# Patient Record
Sex: FEMALE | Race: WHITE | Employment: FULL TIME | URBAN - METROPOLITAN AREA
[De-identification: names, ages, dates, MRNs, and addresses within clinical notes are randomized per-mention and may not be internally consistent; named-entity substitution may affect disease eponyms.]

---

## 2020-07-02 PROBLEM — Z34.00 SUPERVISION OF NORMAL FIRST PREGNANCY: Status: ACTIVE | Noted: 2020-07-02

## 2020-09-11 PROBLEM — O36.62X0 EXCESSIVE FETAL GROWTH AFFECTING MANAGEMENT OF PREGNANCY IN SECOND TRIMESTER: Status: ACTIVE | Noted: 2020-09-11

## 2021-01-28 ENCOUNTER — ANESTHESIA EVENT (OUTPATIENT)
Dept: LABOR AND DELIVERY | Age: 31
End: 2021-01-28
Payer: COMMERCIAL

## 2021-01-28 ENCOUNTER — ANESTHESIA (OUTPATIENT)
Dept: LABOR AND DELIVERY | Age: 31
End: 2021-01-28
Payer: COMMERCIAL

## 2021-01-28 ENCOUNTER — HOSPITAL ENCOUNTER (INPATIENT)
Age: 31
LOS: 2 days | Discharge: HOME | End: 2021-01-30
Attending: OBSTETRICS & GYNECOLOGY | Admitting: OBSTETRICS & GYNECOLOGY
Payer: COMMERCIAL

## 2021-01-28 PROBLEM — Z33.1 IUP (INTRAUTERINE PREGNANCY), INCIDENTAL: Status: ACTIVE | Noted: 2021-01-28

## 2021-01-28 LAB
ABO + RH BLD: NORMAL
BASOPHILS # BLD: 0 K/UL (ref 0–0.1)
BASOPHILS NFR BLD: 0 % (ref 0–2)
BLOOD GROUP ANTIBODIES SERPL: NORMAL
DIFFERENTIAL METHOD BLD: ABNORMAL
EOSINOPHIL # BLD: 0 K/UL (ref 0–0.4)
EOSINOPHIL NFR BLD: 0 % (ref 0–5)
ERYTHROCYTE [DISTWIDTH] IN BLOOD BY AUTOMATED COUNT: 12.6 % (ref 11.6–14.5)
HCT VFR BLD AUTO: 37.7 % (ref 35–45)
HGB BLD-MCNC: 13 G/DL (ref 12–16)
LYMPHOCYTES # BLD: 1.1 K/UL (ref 0.9–3.6)
LYMPHOCYTES NFR BLD: 15 % (ref 21–52)
MCH RBC QN AUTO: 30.8 PG (ref 24–34)
MCHC RBC AUTO-ENTMCNC: 34.5 G/DL (ref 31–37)
MCV RBC AUTO: 89.3 FL (ref 74–97)
MONOCYTES # BLD: 0.5 K/UL (ref 0.05–1.2)
MONOCYTES NFR BLD: 7 % (ref 3–10)
NEUTS SEG # BLD: 6 K/UL (ref 1.8–8)
NEUTS SEG NFR BLD: 78 % (ref 40–73)
PLATELET # BLD AUTO: 173 K/UL (ref 135–420)
PMV BLD AUTO: 11.4 FL (ref 9.2–11.8)
RBC # BLD AUTO: 4.22 M/UL (ref 4.2–5.3)
SPECIMEN EXP DATE BLD: NORMAL
WBC # BLD AUTO: 7.7 K/UL (ref 4.6–13.2)

## 2021-01-28 PROCEDURE — 74011250637 HC RX REV CODE- 250/637: Performed by: OBSTETRICS & GYNECOLOGY

## 2021-01-28 PROCEDURE — 74011000250 HC RX REV CODE- 250: Performed by: ANESTHESIOLOGY

## 2021-01-28 PROCEDURE — 75410000003 HC RECOV DEL/VAG/CSECN EA 0.5 HR

## 2021-01-28 PROCEDURE — 75410000002 HC LABOR FEE PER 1 HR

## 2021-01-28 PROCEDURE — 00HU33Z INSERTION OF INFUSION DEVICE INTO SPINAL CANAL, PERCUTANEOUS APPROACH: ICD-10-PCS | Performed by: ANESTHESIOLOGY

## 2021-01-28 PROCEDURE — 86901 BLOOD TYPING SEROLOGIC RH(D): CPT

## 2021-01-28 PROCEDURE — 74011250636 HC RX REV CODE- 250/636: Performed by: OBSTETRICS & GYNECOLOGY

## 2021-01-28 PROCEDURE — 74011250636 HC RX REV CODE- 250/636: Performed by: ANESTHESIOLOGY

## 2021-01-28 PROCEDURE — 75410000000 HC DELIVERY VAGINAL/SINGLE

## 2021-01-28 PROCEDURE — 76060000078 HC EPIDURAL ANESTHESIA

## 2021-01-28 PROCEDURE — 0KQM0ZZ REPAIR PERINEUM MUSCLE, OPEN APPROACH: ICD-10-PCS | Performed by: OBSTETRICS & GYNECOLOGY

## 2021-01-28 PROCEDURE — 65270000029 HC RM PRIVATE

## 2021-01-28 PROCEDURE — 85025 COMPLETE CBC W/AUTO DIFF WBC: CPT

## 2021-01-28 PROCEDURE — 10907ZC DRAINAGE OF AMNIOTIC FLUID, THERAPEUTIC FROM PRODUCTS OF CONCEPTION, VIA NATURAL OR ARTIFICIAL OPENING: ICD-10-PCS | Performed by: OBSTETRICS & GYNECOLOGY

## 2021-01-28 PROCEDURE — 77030007879 HC KT SPN EPDRL TELE -B: Performed by: ANESTHESIOLOGY

## 2021-01-28 RX ORDER — LIDOCAINE HYDROCHLORIDE 10 MG/ML
INJECTION INFILTRATION; PERINEURAL AS NEEDED
Status: DISCONTINUED | OUTPATIENT
Start: 2021-01-28 | End: 2021-01-28 | Stop reason: HOSPADM

## 2021-01-28 RX ORDER — LIDOCAINE HYDROCHLORIDE AND EPINEPHRINE 15; 5 MG/ML; UG/ML
INJECTION, SOLUTION EPIDURAL AS NEEDED
Status: DISCONTINUED | OUTPATIENT
Start: 2021-01-28 | End: 2021-01-28 | Stop reason: HOSPADM

## 2021-01-28 RX ORDER — ZOLPIDEM TARTRATE 5 MG/1
5 TABLET ORAL
Status: DISCONTINUED | OUTPATIENT
Start: 2021-01-28 | End: 2021-01-30 | Stop reason: HOSPADM

## 2021-01-28 RX ORDER — ACETAMINOPHEN 325 MG/1
650 TABLET ORAL
Status: DISCONTINUED | OUTPATIENT
Start: 2021-01-28 | End: 2021-01-30 | Stop reason: HOSPADM

## 2021-01-28 RX ORDER — PROMETHAZINE HYDROCHLORIDE 25 MG/ML
25 INJECTION, SOLUTION INTRAMUSCULAR; INTRAVENOUS
Status: DISCONTINUED | OUTPATIENT
Start: 2021-01-28 | End: 2021-01-30 | Stop reason: HOSPADM

## 2021-01-28 RX ORDER — LIDOCAINE HYDROCHLORIDE 10 MG/ML
20 INJECTION, SOLUTION EPIDURAL; INFILTRATION; INTRACAUDAL; PERINEURAL AS NEEDED
Status: DISCONTINUED | OUTPATIENT
Start: 2021-01-28 | End: 2021-01-28 | Stop reason: HOSPADM

## 2021-01-28 RX ORDER — MINERAL OIL
30 OIL (ML) ORAL AS NEEDED
Status: COMPLETED | OUTPATIENT
Start: 2021-01-28 | End: 2021-01-28

## 2021-01-28 RX ORDER — NALOXONE HYDROCHLORIDE 0.4 MG/ML
0.2 INJECTION, SOLUTION INTRAMUSCULAR; INTRAVENOUS; SUBCUTANEOUS AS NEEDED
Status: DISCONTINUED | OUTPATIENT
Start: 2021-01-28 | End: 2021-01-28 | Stop reason: HOSPADM

## 2021-01-28 RX ORDER — IBUPROFEN 400 MG/1
800 TABLET ORAL
Status: DISCONTINUED | OUTPATIENT
Start: 2021-01-28 | End: 2021-01-30 | Stop reason: HOSPADM

## 2021-01-28 RX ORDER — FENTANYL/ROPIVACAINE/NS/PF 2MCG/ML-.1
1-15 PLASTIC BAG, INJECTION (ML) EPIDURAL CONTINUOUS
Status: DISCONTINUED | OUTPATIENT
Start: 2021-01-28 | End: 2021-01-28 | Stop reason: HOSPADM

## 2021-01-28 RX ORDER — SODIUM CHLORIDE, SODIUM LACTATE, POTASSIUM CHLORIDE, CALCIUM CHLORIDE 600; 310; 30; 20 MG/100ML; MG/100ML; MG/100ML; MG/100ML
125 INJECTION, SOLUTION INTRAVENOUS CONTINUOUS
Status: DISCONTINUED | OUTPATIENT
Start: 2021-01-28 | End: 2021-01-28 | Stop reason: HOSPADM

## 2021-01-28 RX ORDER — OXYTOCIN/0.9 % SODIUM CHLORIDE 30/500 ML
87.3 PLASTIC BAG, INJECTION (ML) INTRAVENOUS AS NEEDED
Status: COMPLETED | OUTPATIENT
Start: 2021-01-28 | End: 2021-01-28

## 2021-01-28 RX ORDER — OXYTOCIN/0.9 % SODIUM CHLORIDE 30/500 ML
0-20 PLASTIC BAG, INJECTION (ML) INTRAVENOUS
Status: DISCONTINUED | OUTPATIENT
Start: 2021-01-28 | End: 2021-01-28

## 2021-01-28 RX ORDER — PHENYLEPHRINE HCL IN 0.9% NACL 1 MG/10 ML
80 SYRINGE (ML) INTRAVENOUS AS NEEDED
Status: DISCONTINUED | OUTPATIENT
Start: 2021-01-28 | End: 2021-01-28 | Stop reason: HOSPADM

## 2021-01-28 RX ORDER — SODIUM CHLORIDE 0.9 % (FLUSH) 0.9 %
5-40 SYRINGE (ML) INJECTION AS NEEDED
Status: DISCONTINUED | OUTPATIENT
Start: 2021-01-28 | End: 2021-01-28 | Stop reason: HOSPADM

## 2021-01-28 RX ORDER — NALBUPHINE HYDROCHLORIDE 10 MG/ML
10 INJECTION, SOLUTION INTRAMUSCULAR; INTRAVENOUS; SUBCUTANEOUS
Status: DISCONTINUED | OUTPATIENT
Start: 2021-01-28 | End: 2021-01-28 | Stop reason: HOSPADM

## 2021-01-28 RX ORDER — AMOXICILLIN 250 MG
1 CAPSULE ORAL
Status: DISCONTINUED | OUTPATIENT
Start: 2021-01-28 | End: 2021-01-30 | Stop reason: HOSPADM

## 2021-01-28 RX ORDER — OXYCODONE AND ACETAMINOPHEN 5; 325 MG/1; MG/1
2 TABLET ORAL
Status: DISCONTINUED | OUTPATIENT
Start: 2021-01-28 | End: 2021-01-30 | Stop reason: HOSPADM

## 2021-01-28 RX ORDER — BUTORPHANOL TARTRATE 2 MG/ML
2 INJECTION INTRAMUSCULAR; INTRAVENOUS
Status: DISCONTINUED | OUTPATIENT
Start: 2021-01-28 | End: 2021-01-28 | Stop reason: HOSPADM

## 2021-01-28 RX ORDER — OXYTOCIN/RINGER'S LACTATE 30/500 ML
10 PLASTIC BAG, INJECTION (ML) INTRAVENOUS AS NEEDED
Status: COMPLETED | OUTPATIENT
Start: 2021-01-28 | End: 2021-01-28

## 2021-01-28 RX ORDER — HYDROMORPHONE HYDROCHLORIDE 1 MG/ML
1 INJECTION, SOLUTION INTRAMUSCULAR; INTRAVENOUS; SUBCUTANEOUS
Status: DISCONTINUED | OUTPATIENT
Start: 2021-01-28 | End: 2021-01-28 | Stop reason: HOSPADM

## 2021-01-28 RX ORDER — ONDANSETRON 2 MG/ML
4 INJECTION INTRAMUSCULAR; INTRAVENOUS
Status: DISCONTINUED | OUTPATIENT
Start: 2021-01-28 | End: 2021-01-28 | Stop reason: HOSPADM

## 2021-01-28 RX ORDER — SODIUM CHLORIDE 0.9 % (FLUSH) 0.9 %
5-40 SYRINGE (ML) INJECTION EVERY 8 HOURS
Status: DISCONTINUED | OUTPATIENT
Start: 2021-01-28 | End: 2021-01-28 | Stop reason: HOSPADM

## 2021-01-28 RX ORDER — FENTANYL CITRATE 50 UG/ML
100 INJECTION, SOLUTION INTRAMUSCULAR; INTRAVENOUS ONCE
Status: COMPLETED | OUTPATIENT
Start: 2021-01-28 | End: 2021-01-28

## 2021-01-28 RX ORDER — TERBUTALINE SULFATE 1 MG/ML
0.25 INJECTION SUBCUTANEOUS
Status: DISCONTINUED | OUTPATIENT
Start: 2021-01-28 | End: 2021-01-28 | Stop reason: HOSPADM

## 2021-01-28 RX ORDER — PROMETHAZINE HYDROCHLORIDE 25 MG/ML
25 INJECTION, SOLUTION INTRAMUSCULAR; INTRAVENOUS
Status: DISCONTINUED | OUTPATIENT
Start: 2021-01-28 | End: 2021-01-28 | Stop reason: SDUPTHER

## 2021-01-28 RX ORDER — METHYLERGONOVINE MALEATE 0.2 MG/ML
0.2 INJECTION INTRAVENOUS AS NEEDED
Status: DISCONTINUED | OUTPATIENT
Start: 2021-01-28 | End: 2021-01-28 | Stop reason: HOSPADM

## 2021-01-28 RX ORDER — FENTANYL CITRATE 50 UG/ML
INJECTION, SOLUTION INTRAMUSCULAR; INTRAVENOUS AS NEEDED
Status: DISCONTINUED | OUTPATIENT
Start: 2021-01-28 | End: 2021-01-28 | Stop reason: HOSPADM

## 2021-01-28 RX ORDER — LIDOCAINE HYDROCHLORIDE AND EPINEPHRINE 15; 5 MG/ML; UG/ML
INJECTION, SOLUTION EPIDURAL
Status: SHIPPED | OUTPATIENT
Start: 2021-01-28 | End: 2021-01-28

## 2021-01-28 RX ADMIN — FENTANYL CITRATE 100 MCG: 50 INJECTION, SOLUTION INTRAMUSCULAR; INTRAVENOUS at 07:57

## 2021-01-28 RX ADMIN — LIDOCAINE HYDROCHLORIDE 3 ML: 10 INJECTION, SOLUTION INFILTRATION; PERINEURAL at 07:58

## 2021-01-28 RX ADMIN — LIDOCAINE HYDROCHLORIDE,EPINEPHRINE BITARTRATE 2 ML: 15; .005 INJECTION, SOLUTION EPIDURAL; INFILTRATION; INTRACAUDAL; PERINEURAL at 08:05

## 2021-01-28 RX ADMIN — MINERAL OIL 30 ML: 1000 SOLUTION ORAL at 14:43

## 2021-01-28 RX ADMIN — SODIUM CHLORIDE, SODIUM LACTATE, POTASSIUM CHLORIDE, AND CALCIUM CHLORIDE 125 ML/HR: 600; 310; 30; 20 INJECTION, SOLUTION INTRAVENOUS at 15:15

## 2021-01-28 RX ADMIN — Medication 87.3 MILLI-UNITS/MIN: at 16:51

## 2021-01-28 RX ADMIN — SODIUM CHLORIDE, SODIUM LACTATE, POTASSIUM CHLORIDE, AND CALCIUM CHLORIDE 125 ML/HR: 600; 310; 30; 20 INJECTION, SOLUTION INTRAVENOUS at 08:06

## 2021-01-28 RX ADMIN — Medication 2 MILLI-UNITS/MIN: at 15:12

## 2021-01-28 RX ADMIN — FENTANYL CITRATE 100 MCG: 50 INJECTION, SOLUTION INTRAMUSCULAR; INTRAVENOUS at 08:07

## 2021-01-28 RX ADMIN — SODIUM CHLORIDE, SODIUM LACTATE, POTASSIUM CHLORIDE, AND CALCIUM CHLORIDE 300 ML: 600; 310; 30; 20 INJECTION, SOLUTION INTRAVENOUS at 14:52

## 2021-01-28 RX ADMIN — SODIUM CHLORIDE, SODIUM LACTATE, POTASSIUM CHLORIDE, AND CALCIUM CHLORIDE 125 ML/HR: 600; 310; 30; 20 INJECTION, SOLUTION INTRAVENOUS at 07:12

## 2021-01-28 RX ADMIN — Medication 909 MILLI-UNITS: at 16:40

## 2021-01-28 RX ADMIN — Medication 12 ML/HR: at 08:07

## 2021-01-28 RX ADMIN — LIDOCAINE HYDROCHLORIDE,EPINEPHRINE BITARTRATE 3 ML: 15; .005 INJECTION, SOLUTION EPIDURAL; INFILTRATION; INTRACAUDAL; PERINEURAL at 07:57

## 2021-01-28 NOTE — PROGRESS NOTES
2524  @ 40 weeks arrived from home with c/o ctxs. To bathroom to void and change to gown, ambulated to bed and connected to EFM. 0710 Bedside shift change report given to FARRAH Burr RN (oncoming nurse) by Afshan Muñoz RN   (offgoing nurse). Report included the following information SBAR, Kardex and MAR.

## 2021-01-28 NOTE — LACTATION NOTE
This note was copied from a baby's chart. 200 mom intends to pump only, does not want to attempt to latch  to breast. Setting mom up with her personal pump. Encouraged q3 pumping, hydration, skin to skin, and rest. Mom verbalized understanding. Formula provided to mom and educated on infant's stomach size, WNL volume intake in , how to safely prepare formula, bottle hygiene, appropriate way to feed infant with bottle, and burping techniques. Handout given for reinforcement. Mom verbalized understanding and no questions at this time.  set mom up with using personal pump. Answered all questions and concerns.

## 2021-01-28 NOTE — PROGRESS NOTES
6692 Dr. John Hutchinson. Tamika Mirza called unit for update. Informed of SVE and bloody show. Admission orders received.

## 2021-01-28 NOTE — ANESTHESIA PREPROCEDURE EVALUATION
Relevant Problems   RESPIRATORY SYSTEM   (+) History of UTI       Anesthetic History   No history of anesthetic complications            Review of Systems / Medical History  Patient summary reviewed, nursing notes reviewed and pertinent labs reviewed    Pulmonary  Within defined limits                 Neuro/Psych   Within defined limits           Cardiovascular  Within defined limits                Exercise tolerance: >4 METS     GI/Hepatic/Renal  Within defined limits              Endo/Other  Within defined limits           Other Findings              Physical Exam    Airway  Mallampati: I  TM Distance: 4 - 6 cm  Neck ROM: decreased range of motion   Mouth opening: Normal     Cardiovascular  Regular rate and rhythm,  S1 and S2 normal,  no murmur, click, rub, or gallop  Rhythm: regular  Rate: normal         Dental         Pulmonary  Breath sounds clear to auscultation               Abdominal  GI exam deferred       Other Findings            Anesthetic Plan    ASA: 1  Anesthesia type: epidural            Anesthetic plan and risks discussed with: Patient and Spouse      Risks of bleeding, infection, nerve injury, failed block, spinal tap causing headache and requiring epidural blood patch, back pain, and failed block discussed. Procedure described as elective. Pt understands and desires to proceed.

## 2021-01-28 NOTE — PROGRESS NOTES
26  at 40 weeks admitted to labor room 2. Pt oriented to room and call bell. Pt planning on epidural at this time. 7865 Dr. Maria Isabel Raman at bedside explaining epidural procedure, side effects, risks, benefits, and positioning. Patient verbalizes understanding. Time-out: 0800  Procedure start: 08  Catheter in, needle out: 0805  Test dose: 0803  Fentanyl vial handed to MD at bedside. Loading dose: 0806  Patient connected to pump: 0807    0810 Pt positioned in semi-fowlers. US and TOCO adjusted. 0815 Pt positioned on bedpan. Pt voids 50 ml.     0818 Pt positioned in semi-fowlers. US and TOCO adjusted. 0856 Pt turned to right lateral with peanut ball in place. US and TOCO adjusted. 8847 Dr. Felipe Justice at bedside. SVE: 6/0 AROM moderate amount of clear fluid. Pt positioned in semi-fowlers. US and TOCO adjusted. 1000 Pt turned to left lateral with peanut ball in place. US and TOCO adjusted. 1050 Pt positioned on bedpan. Voided 400ml. Pt positioned in throne position. US and TOCO adjusted. 1130 Pt turned to right lateral with peanut ball in place. US and TOCO adjusted. 1230 Pt turned to left lateral with peanut ball in place. US and TOCO adjusted. 1300 Pt positioned on bedpan. Pt voids 50 ml.     1435 Dr. Felipe Justice at bedside. SVE: complete    1440 Straight cath 250ml. 1442 Pt pushing. RN and MD at bedside. continuously monitoring FHT until delivery. 1512 Pitocin started for augmentation. Pt continues to push. RN and MD at bedside continuously monitoring FHT until delivery. 1635 Vaginal delivery of viable female infant. Vigorous cry noted with tactile stimulation and bulb suction. Infant placed skin to skin on mother's chest. FOB cut cord. 1640 Delivery of intact placenta. 1700 Karen-care, pad, and ice-pack provided. 1805 Pt up to restroom. 1925 TRANSFER - OUT REPORT:    Verbal report given to LISA Nash RN (name) on Michelle dumont transferred to postpartum (unit) for routine progression of care       Report consisted of patients Situation, Background, Assessment and   Recommendations(SBAR). Information from the following report(s) SBAR, Kardex, Intake/Output and MAR was reviewed with the receiving nurse. Lines:   Peripheral IV 01/28/21 Left;Posterior Forearm (Active)   Site Assessment Clean, dry, & intact 01/28/21 0722   Phlebitis Assessment 0 01/28/21 0722   Infiltration Assessment 0 01/28/21 0722   Dressing Status Clean, dry, & intact 01/28/21 0722   Dressing Type Tape;Transparent 01/28/21 0722   Hub Color/Line Status Pink 01/28/21 0722   Alcohol Cap Used Yes 01/28/21 2116        Opportunity for questions and clarification was provided.       Patient transported with:   Registered Nurse

## 2021-01-28 NOTE — L&D DELIVERY NOTE
Delivery Summary    Patient: Yg Briggs MRN: [id number]  SSN: [id number]    YOB: 1990  Age: 26 y.o. Sex: female       Information for the patient's : Jeanne Barragan [[id number]]       Labor Events:    Labor: No    Steroids: None   Cervical Ripening Date/Time:       Cervical Ripening Type: None   Antibiotics During Labor: No   Rupture Identifier:      Rupture Date/Time: 2021 9:20 AM   Rupture Type: AROM   Amniotic Fluid Volume: Moderate    Amniotic Fluid Description: Clear    Amniotic Fluid Odor: None    Induction: None       Induction Date/Time:        Indications for Induction:      Augmentation: Oxytocin   Augmentation Date/Time: 13:12 PM   Indications for Augmentation: Ineffective Contraction Pattern   Labor complications: None       Additional complications:        Delivery Events:  Indications For Episiotomy:     Episiotomy: None   Perineal Laceration(s): 2nd   Repaired: Yes   Periurethral Laceration Location:      Repaired:     Labial Laceration Location:     Repaired:     Sulcal Laceration Location:     Repaired:     Vaginal Laceration Location:     Repaired:     Cervical Laceration Location:     Repaired:     Repair Suture: Vicryl 2-0   Number of Repair Packets: 1   Estimated Blood Loss (ml): 100ml   Quantitative Blood Loss (ml)                Delivery Date: 2021    Delivery Time: 4:35 PM  Delivery Type: Vaginal, Spontaneous  Sex:  Female    Gestational Age: 37w0d   Delivery Clinician:  Austin Sears  Living Status: Living   Delivery Location: L&D            APGARS  One minute Five minutes Ten minutes   Skin color: 1   1        Heart rate: 2   2        Grimace: 2   2        Muscle tone: 2   2        Breathin   2        Totals: 9   9            Presentation: Vertex    Position: Right Occiput Posterior  Resuscitation Method:  Suctioning-bulb; Tactile Stimulation     Meconium Stained: None      Cord Information: 3 Vessels  Complications: None  Cord around:    Delayed cord clamping? Yes  Cord clamped date/time:   Disposition of Cord Blood: Lab    Blood Gases Sent?: No    Placenta:  Date/Time: 2021  4:40 PM  Removal: Spontaneous      Appearance: Normal     [place] Measurements:  Birth Weight: 6 lb 13.5 oz (3.105 kg)      Birth Length: 1' 7\" (0.483 m)      Head Circumference: 1' 1.39\" (0.34 m)      Chest Circumference: 1' 0.99\" (0.33 m)     Abdominal Girth: 1' 0.21\" (0.31 m)    Other Providers:   RANULFO Schaeffer;Leandra PAUL, Obstetrician;Primary Nurse;Primary [place] Nurse; Anesthesiologist           Group B Strep: No results found for: GRBSEXT, GRBSEXT  Information for the patient's : Jessa Sánchez [[id number]]   No results found for: ABORH, PCTABR, PCTDIG, BILI, ABORHEXT, ABORH     No results for input(s): PCO2CB, PO2CB, HCO3I, SO2I, IBD, PTEMPI, SPECTI, PHICB, ISITE, IDEV, IALLEN in the last 72 hours.

## 2021-01-28 NOTE — H&P
History & Physical    Name: Ayah Turner MRN: [id number]  SSN: [id number]    YOB: 1990  Age: 26 y.o. Sex: female        Subjective:     Estimated Date of Delivery: 21  OB History    Para Term  AB Living   1 0 0 0 0 0   SAB TAB Ectopic Molar Multiple Live Births   0 0 0 0 0 0      # Outcome Date GA Lbr Pérez/2nd Weight Sex Delivery Anes PTL Lv   1 Current                Ms. Russel Rdz is admitted with pregnancy at 40w0d for Increasingly painful contractions. Prenatal course was normal. Please see prenatal records for details. Past Medical History:   Diagnosis Date    Abnormal Papanicolaou smear of cervix     HPV    Condyloma 2015    perianal     Past Surgical History:   Procedure Laterality Date    HX WISDOM TEETH EXTRACTION       Social History     Occupational History    Not on file   Tobacco Use    Smoking status: Never Smoker    Smokeless tobacco: Never Used   Substance and Sexual Activity    Alcohol use: Not Currently     Comment: occ    Drug use: No    Sexual activity: Yes     Partners: Male     Birth control/protection: None     History reviewed. No pertinent family history. No Known Allergies  Prior to Admission medications    Medication Sig Start Date End Date Taking? Authorizing Provider   PNV Comb #2-Iron-Omega 3-FA [id number] mg cmpk Take  by mouth. Yes Provider, Historical        Review of Systems: Pertinent items are noted in HPI. Objective:     Vitals:  Vitals:    21 0809 21 0814 21 0819 21 0829   BP: 122/74 (!) 109/55 (!) 108/59 (!) 103/57   Pulse: (!) 101 89 88 75   Resp:       Temp:       SpO2: 97% 96% 96% 96%   Weight:       Height:            Physical Exam:  Patient without distress. Abdomen: soft, non tender, EFW 7 lbs. Cervical Exam: 6 cm dilated/90/0    Membranes:  Artificial Rupture of Membranes;  Amniotic Fluid: medium amount of clear fluid  Fetal Heart Rate: Reactive    Prenatal Labs:   Lab Results Component Value Date/Time    ABO/Rh(D) PENDING 01/28/2021 07:05 AM         Assessment/Plan:     Active Problems:    IUP (intrauterine pregnancy), incidental (1/28/2021)         Plan: Admit for Reassuring fetal status, Continue plan for vaginal delivery. Group B Strep was negative.

## 2021-01-28 NOTE — PROGRESS NOTES
Problem: Patient Education: Go to Patient Education Activity  Goal: Patient/Family Education  Outcome: Progressing Towards Goal     Problem: Vaginal Delivery: Day of Deliver-Laboring  Goal: Activity/Safety  Outcome: Progressing Towards Goal  Goal: Consults, if ordered  Outcome: Progressing Towards Goal  Goal: Diagnostic Test/Procedures  Outcome: Progressing Towards Goal  Goal: Nutrition/Diet  Outcome: Progressing Towards Goal  Goal: Discharge Planning  Outcome: Progressing Towards Goal  Goal: Medications  Outcome: Progressing Towards Goal  Goal: Respiratory  Outcome: Progressing Towards Goal  Goal: Treatments/Interventions/Procedures  Outcome: Progressing Towards Goal  Goal: *Vital signs within defined limits  Outcome: Progressing Towards Goal  Goal: *Labs within defined limits  Outcome: Progressing Towards Goal  Goal: *Hemodynamically stable  Outcome: Progressing Towards Goal  Goal: *Optimal pain control at patient's stated goal  Outcome: Progressing Towards Goal     Problem: Patient Education: Go to Patient Education Activity  Goal: Patient/Family Education  1/28/2021 0750 by Broadford Dock  Outcome: Progressing Towards Goal  1/28/2021 0749 by Broadford Dock  Outcome: Progressing Towards Goal     Problem: Vaginal Delivery: Day of Deliver-Laboring  Goal: Activity/Safety  1/28/2021 0750 by Broadford Dock  Outcome: Progressing Towards Goal  1/28/2021 0749 by Broadford Dock  Outcome: Progressing Towards Goal  Goal: Consults, if ordered  1/28/2021 0750 by Broadford Dock  Outcome: Progressing Towards Goal  1/28/2021 0749 by Broadford Dock  Outcome: Progressing Towards Goal  Goal: Diagnostic Test/Procedures  1/28/2021 0750 by Broadford Dock  Outcome: Progressing Towards Goal  1/28/2021 0749 by Broadford Dock  Outcome: Progressing Towards Goal  Goal: Nutrition/Diet  1/28/2021 0750 by Broadford Dock  Outcome: Progressing Towards Goal  1/28/2021 0749 by Broadford Dock  Outcome: Progressing Towards Goal  Goal: Discharge Planning  1/28/2021 0750 by Jonatan Albert  Outcome: Progressing Towards Goal  1/28/2021 0749 by Jonatan Albert  Outcome: Progressing Towards Goal  Goal: Medications  1/28/2021 0750 by Yeimi MCCLELLAND  Outcome: Progressing Towards Goal  1/28/2021 0749 by Jonatan Albert  Outcome: Progressing Towards Goal  Goal: Respiratory  1/28/2021 0750 by Yeimi MCCLELLAND  Outcome: Progressing Towards Goal  1/28/2021 0749 by Jonatan Albert  Outcome: Progressing Towards Goal  Goal: Treatments/Interventions/Procedures  1/28/2021 0750 by Yeimi MCCLELLAND  Outcome: Progressing Towards Goal  1/28/2021 0749 by Jonatan Albert  Outcome: Progressing Towards Goal  Goal: *Vital signs within defined limits  1/28/2021 0750 by Jonatan Albert  Outcome: Progressing Towards Goal  1/28/2021 0749 by Jonatan Albert  Outcome: Progressing Towards Goal  Goal: *Labs within defined limits  1/28/2021 0750 by Jonatan Albert  Outcome: Progressing Towards Goal  1/28/2021 0749 by Jonatan Albert  Outcome: Progressing Towards Goal  Goal: *Hemodynamically stable  1/28/2021 0750 by Jonatan Albert  Outcome: Progressing Towards Goal  1/28/2021 0749 by Jonatan Albert  Outcome: Progressing Towards Goal  Goal: *Optimal pain control at patient's stated goal  1/28/2021 0750 by Jonatan Albert  Outcome: Progressing Towards Goal  1/28/2021 0749 by Jonatan Albert  Outcome: Progressing Towards Goal     Problem: Pain  Goal: *Control of Pain  Outcome: Progressing Towards Goal

## 2021-01-28 NOTE — ANESTHESIA PROCEDURE NOTES
Epidural Block    Start time: 1/28/2021 7:57 AM  End time: 1/28/2021 8:10 AM  Reason for block: labor epidural  Staffing  Performed: attending   Anesthesiologist: Rosa Maria Avina MD  Preanesthetic Checklist  Completed: patient identified, IV checked, site marked, risks and benefits discussed, surgical consent, monitors and equipment checked, pre-op evaluation and timeout performed  Block Placement  Patient position: sitting  Prep: ChloraPrep  Sterility prep: cap, drape, gloves and mask  Sedation level: no sedation  Patient monitoring: continuous pulse oximetry, heart rate and frequent blood pressure checks  Approach: midline  Location: lumbar  Epidural  Loss of resistance technique: saline  Guidance: landmark technique and ultrasound guided  Needle  Needle type: Tuohy   Needle gauge: 17 G  Needle length: 9 cm  Needle insertion depth: 3.5 cm  Catheter type: multi-orifice  Catheter size: 20 G  Catheter at skin depth: 8.5 cm  Catheter securement method: clear occlusive dressing and surgical tape  Test dose: negative  Medications Administered  Lidocaine-EPINEPHrine (XYLOCAINE) 1.5 %-1:200,000 Epidural, 3 mL  Assessment  Sensory level: T10  Block outcome: pain improved  Number of attempts: 1  Procedure assessment: patient tolerated procedure well with no complications  Additional Notes  After epidural placement, patient without sensory or motor block. Patient denies headache or backache.

## 2021-01-29 PROBLEM — O36.62X0 EXCESSIVE FETAL GROWTH AFFECTING MANAGEMENT OF PREGNANCY IN SECOND TRIMESTER: Status: RESOLVED | Noted: 2020-09-11 | Resolved: 2021-01-29

## 2021-01-29 PROBLEM — Z33.1 IUP (INTRAUTERINE PREGNANCY), INCIDENTAL: Status: RESOLVED | Noted: 2021-01-28 | Resolved: 2021-01-29

## 2021-01-29 PROBLEM — Z34.00 SUPERVISION OF NORMAL FIRST PREGNANCY: Status: RESOLVED | Noted: 2020-07-02 | Resolved: 2021-01-29

## 2021-01-29 LAB
HCT VFR BLD AUTO: 34.2 % (ref 35–45)
HGB BLD-MCNC: 11.5 G/DL (ref 12–16)

## 2021-01-29 PROCEDURE — 36415 COLL VENOUS BLD VENIPUNCTURE: CPT

## 2021-01-29 PROCEDURE — 74011250637 HC RX REV CODE- 250/637: Performed by: OBSTETRICS & GYNECOLOGY

## 2021-01-29 PROCEDURE — 85014 HEMATOCRIT: CPT

## 2021-01-29 PROCEDURE — 85018 HEMOGLOBIN: CPT

## 2021-01-29 PROCEDURE — 65270000029 HC RM PRIVATE

## 2021-01-29 RX ADMIN — IBUPROFEN 800 MG: 400 TABLET, FILM COATED ORAL at 22:16

## 2021-01-29 RX ADMIN — IBUPROFEN 800 MG: 400 TABLET, FILM COATED ORAL at 13:39

## 2021-01-29 NOTE — PROGRESS NOTES
Problem: Vaginal Delivery: Day of Deliver-Laboring  Goal: *Optimal pain control at patient's stated goal  Outcome: Progressing Towards Goal     Problem: Pain  Goal: *Control of Pain  Outcome: Progressing Towards Goal

## 2021-01-29 NOTE — PROGRESS NOTES
TRANSFER - IN REPORT:    Verbal report received from FARRAH Burr RN (name) on Benecoraa Samples  being received from Labor and Delivery (unit) for routine progression of care      Report consisted of patients Situation, Background, Assessment and   Recommendations(SBAR). Information from the following report(s) SBAR, Kardex, Procedure Summary, Intake/Output, MAR, Accordion, Recent Results and Med Rec Status was reviewed with the receiving nurse. Opportunity for questions and clarification was provided. Assessment completed upon patients arrival to unit and care assumed.

## 2021-01-29 NOTE — PROGRESS NOTES
Bedside and Verbal shift change report given to PIO Benavides RN (oncoming nurse) by LISA Nash RN (offgoing nurse). Report included the following information SBAR, Kardex, Procedure Summary, Intake/Output, MAR, Accordion, Recent Results and Med Rec Status.      Mother and baby progressing well. Baby having some difficulty taking bottle/ formula. This RN assisted with feeding, and able to get baby to feed with sensitive formula and slow flow nipple. Mother pumping and hoping to feed breast milk through bottle, but has not been able to get any milk yet.        Bedside shift change report given to Jenna Patel RN (oncoming nurse) by Star López RN (offgoing nurse).  Report included the following information SBAR.

## 2021-01-29 NOTE — PROGRESS NOTES
Post-Partum Day Number 1 Progress Note    Jaz Faria     Assessment:   Hospital Problems  Date Reviewed: 2021          Codes Class Noted POA    * (Principal) IUP (intrauterine pregnancy), incidental ICD-10-CM: Z33.1  ICD-9-CM: V22.2  2021 Yes        Spontaneous vaginal delivery ICD-10-CM: O80  ICD-9-CM: 650  2021 No        Perineal laceration with delivery, second degree ICD-10-CM: O70.1  ICD-9-CM: 664.11  2021 No            Doing well, post partum day 1    Plan:  1. Continue routine postpartum and perineal care as well as maternal education.  2. Plan for discharge tomorrow.    Information for the patient's :  LILI Faria [[id number]]   Vaginal, Spontaneous    Patient doing well without significant complaint.  Voiding without difficulty, normal lochia. Patient is pumping.  Baby had to switch to sensitive formula bottle this morning.     Current Facility-Administered Medications   Medication Dose Route Frequency   • prenatal vit-calcium-iron-fa (PRENATAL PLUS with CALCIUM) tablet 1 Tab  1 Tab Oral DAILY       Vitals:  Visit Vitals  /66 (BP 1 Location: Left upper arm, BP Patient Position: Supine)   Pulse 77   Temp 97.7 °F (36.5 °C)   Resp 16   Ht 5' 5\" (1.651 m)   Wt 142 lb (64.4 kg)   LMP 2020 (Approximate)   SpO2 97%   Breastfeeding Unknown   BMI 23.63 kg/m²     Temp (24hrs), Av.1 °F (36.7 °C), Min:97.7 °F (36.5 °C), Max:99 °F (37.2 °C)        Exam:   Patient without distress.                Abdomen soft, fundus firm, nontender                Perineum with normal lochia noted.                Lower extremities are negative for swelling, cords or tenderness.    Labs:     Lab Results   Component Value Date/Time    WBC 7.7 2021 07:05 AM    WBC 5.1 2020 11:35 AM    HGB 11.5 (L) 2021 02:00 AM    HGB 13.0 2021 07:05 AM    HGB 12.9 2020 10:05 AM    HCT 34.2 (L) 2021 02:00 AM    HCT 37.7 2021 07:05 AM    HCT 40.7 2020  11:35 AM    PLATELET 138 85/30/0695 07:05 AM    PLATELET 058 37/70/5845 11:35 AM       Recent Results (from the past 24 hour(s))   HEMOGLOBIN    Collection Time: 01/29/21  2:00 AM   Result Value Ref Range    HGB 11.5 (L) 12.0 - 16.0 g/dL   HEMATOCRIT    Collection Time: 01/29/21  2:00 AM   Result Value Ref Range    HCT 34.2 (L) 35.0 - 45.0 %

## 2021-01-29 NOTE — PROGRESS NOTES
Bedside and Verbal shift change report given to PIO Benavides RN (oncoming nurse) by LISA Nash RN (offgoing nurse). Report included the following information SBAR, Kardex, Procedure Summary, Intake/Output, MAR, Accordion, Recent Results and Med Rec Status.

## 2021-01-29 NOTE — PROGRESS NOTES
1520 Report Received from Saleem Knapp RN.    4232 Assessed patient. Discussed meals, showering, pain management, discharge plans, as well as tests needed for the baby. 1915 Bedside shift change report given to SHAHBAZ Bloom (oncoming nurse) by Burt Vang RN (offgoing nurse). Report included the following information SBAR, Procedure Summary, Intake/Output, MAR and Recent Results.

## 2021-01-29 NOTE — ANESTHESIA POSTPROCEDURE EVALUATION
1/29/2021  1:46 PM    Laboring Epidural Follow-up Note     Referring physician: Chris Aamral, *   Patient status post vaginal delivery with labor epidural    Visit Vitals  /66 (BP 1 Location: Left upper arm, BP Patient Position: Supine)   Pulse 77   Temp 36.5 °C (97.7 °F)   Resp 16   Ht 5' 5\" (1.651 m)   Wt 64.4 kg (142 lb)   LMP 04/23/2020 (Approximate)   SpO2 97%   Breastfeeding Unknown   BMI 23.63 kg/m²       Epidural removed by L&D staff  No sedation, pruritis noted. Adequate analgesia.   No obvious anesthesia complications          Chanda Bradshaw, CRNA

## 2021-01-30 VITALS
RESPIRATION RATE: 16 BRPM | SYSTOLIC BLOOD PRESSURE: 112 MMHG | TEMPERATURE: 98.1 F | OXYGEN SATURATION: 100 % | HEART RATE: 69 BPM | DIASTOLIC BLOOD PRESSURE: 69 MMHG | BODY MASS INDEX: 23.66 KG/M2 | WEIGHT: 142 LBS | HEIGHT: 65 IN

## 2021-01-30 PROCEDURE — 74011250637 HC RX REV CODE- 250/637: Performed by: OBSTETRICS & GYNECOLOGY

## 2021-01-30 RX ADMIN — PRENATAL WITH FERROUS FUM AND FOLIC ACID 1 TABLET: 3080; 920; 120; 400; 22; 1.84; 3; 20; 10; 1; 12; 200; 27; 25; 2 TABLET ORAL at 09:05

## 2021-01-30 NOTE — ROUTINE PROCESS
Bedside and Verbal shift change report given to DVA Bateman (oncoming nurse) by CR Sheehan RN (offgoing nurse). Report included the following information SBAR, Kardex, Intake/Output, MAR and Recent Results.

## 2021-01-30 NOTE — PROGRESS NOTES
Problem: Pain  Goal: *Control of Pain  Outcome: Progressing Towards Goal     Problem: Vaginal Delivery: Postpartum Day 1  Goal: Activity/Safety  Outcome: Progressing Towards Goal  Goal: Consults, if ordered  Outcome: Progressing Towards Goal  Goal: Diagnostic Test/Procedures  Outcome: Progressing Towards Goal  Goal: Nutrition/Diet  Outcome: Progressing Towards Goal  Goal: Discharge Planning  Outcome: Progressing Towards Goal  Goal: Medications  Outcome: Progressing Towards Goal  Goal: Treatments/Interventions/Procedures  Outcome: Progressing Towards Goal  Goal: Psychosocial  Outcome: Progressing Towards Goal  Goal: *Vital signs within defined limits  Outcome: Progressing Towards Goal  Goal: *Labs within defined limits  Outcome: Progressing Towards Goal  Goal: *Hemodynamically stable  Outcome: Progressing Towards Goal  Goal: *Optimal pain control at patient's stated goal  Outcome: Progressing Towards Goal  Goal: *Participates in infant care  Outcome: Progressing Towards Goal  Goal: *Demonstrates progressive activity  Outcome: Progressing Towards Goal  Goal: *Performs self perineal care  Outcome: Progressing Towards Goal  Goal: *Appropriate parent-infant bonding  Outcome: Progressing Towards Goal  Goal: *Tolerating diet  Outcome: Progressing Towards Goal  Goal: *Performs self breast care  Outcome: Progressing Towards Goal

## 2021-01-30 NOTE — DISCHARGE INSTRUCTIONS
POST DELIVERY DISCHARGE INSTRUCTIONS    Name: Kathya Mireles  YOB: 1990  Primary Diagnosis: Active Problems:    Spontaneous vaginal delivery (2021)      Perineal laceration with delivery, second degree (2021)        General:     Diet/Diet Restrictions:  Eight 8-ounce glasses of fluid daily (water, juices); avoid excessive caffeine intake. Meals/snacks as desired which are high in fiber and carbohydrates and low in fat and cholesterol. Physical Activity / Restrictions / Safety:     Avoid heavy lifting, no more that 8 lbs. For 2-3 weeks; limit use of stairs to 2 times daily for the first week home. No driving for one week. Avoid intercourse 4-6 weeks, no douching or tampon use. Check with obstetrician before starting or resuming an exercise program.         Discharge Instructions/Special Treatment/Home Care Needs:     Continue prenatal vitamins. Continue to use squirt bottle with warm water on your episiotomy after each bathroom use until bleeding stops. If steri-strips applied to your incision, remove in 7-10 days. Call your doctor for the following:     Fever over 101 degrees by mouth. Vaginal bleeding heavier than a normal menstrual period or clot larger than a golf ball. Red streaks or increased swelling of legs, painful red streaks on your breast.  Painful urination, constipation and increased pain or swelling or discharge with your incision. If you feel extremely anxious or overwhelmed. If you have thoughts of harming yourself and/or your baby. Pain Management:     Pain Management:   Take Acetaminophen (Tylenol) or Ibuprofen (Advil, Motrin), as directed for pain. Use a warm Sitz bath 3 times daily to relieve episiotomy or hemorrhoidal discomfort. Heating pad to  incision as needed. For hemorrhoidal discomfort, use Tucks and Anusol cream as needed and directed. Follow-Up Care:      These are general instructions for a healthy lifestyle:    No smoking/ No tobacco products/ Avoid exposure to second hand smoke    Surgeon General's Warning:  Quitting smoking now greatly reduces serious risk to your health. Obesity, smoking, and sedentary lifestyle greatly increases your risk for illness    A healthy diet, regular physical exercise & weight monitoring are important for maintaining a healthy lifestyle    Recognize signs and symptoms of STROKE:    F-face looks uneven    A-arms unable to move or move unevenly    S-speech slurred or non-existent    T-time-call 911 as soon as signs and symptoms begin-DO NOT go       Back to bed or wait to see if you get better-TIME IS BRAIN. MyChart Activation    Thank you for requesting access to [employer]. Please follow the instructions below to securely access and download your online medical record. [facility] allows you to send messages to your doctor, view your test results, renew your prescriptions, schedule appointments, and more. How Do I Sign Up? 1. In your internet browser, go to [email or web address]  2. Click on the First Time User? Click Here link in the Sign In box. You will be redirect to the New Member Sign Up page. 3. Enter your [facility] Access Code exactly as it appears below. You will not need to use this code after youve completed the sign-up process. If you do not sign up before the expiration date, you must request a new code. [facility] Access Code: [email or web address]  Expires: 3/15/2021  8:52 AM (This is the date your [facility] access code will )    4. Enter the last four digits of your Social Security Number (xxxx) and Date of Birth (mm/dd/yyyy) as indicated and click Submit. You will be taken to the next sign-up page. 5. Create a [facility] ID. This will be your [facility] login ID and cannot be changed, so think of one that is secure and easy to remember. 6. Create a [facility] password. You can change your password at any time. 7. Enter your Password Reset Question and Answer.  This can be used at a later time if you forget your password. 8. Enter your e-mail address. You will receive e-mail notification when new information is available in [address] 9. Click Sign Up. You can now view and download portions of your medical record. 10. Click the Download Summary menu link to download a portable copy of your medical information. Additional Information    If you have questions, please visit the Frequently Asked Questions section of the [employer] website at [email or web address] Remember, [facility] is NOT to be used for urgent needs. For medical emergencies, dial 911. Patient armband removed and given to patient to take home.   Patient was informed of the privacy risks if armband lost or stolen

## 2021-01-30 NOTE — ROUTINE PROCESS
0730 Bedside and Verbal shift change report given to DAV Ziegler (oncoming nurse) by CR Rascon RN (offgoing nurse). Report included the following information SBAR, Kardex, Procedure Summary, Intake/Output, MAR, Accordion and Recent Results. 0815 Bedside and Verbal shift change report given to LORRAINE Reyes RN (oncoming nurse) by DAV Ziegler (offgoing nurse). Report included the following information SBAR, Kardex, Procedure Summary, Intake/Output, MAR, Accordion and Recent Results.

## 2021-01-30 NOTE — PROGRESS NOTES
0815 Bedside and Verbal shift change report given to LORRAINE Reyes RN (oncoming nurse) by DAV Bloom (offgoing nurse). Report included the following information SBAR, Kardex, Intake/Output, MAR and Recent Results. 0845 Sitting in chair like position, no needs expressed at this time, ashely aranda within reach     0905 Scheduled prenatal given. Shift assessment complete. Patient denies headache, visual disturbances, and right epigastic pain at this time. Breath sounds clear bilaterally. Patient is passing gas, bowel sounds active, with last BM being 1/29/21. Fundus firm at U-2 with scant lochia, no clots noted on assessment. IV site intact. No edema in upper extremities, no in lower extremities. Patient free of clonus in lower extremities and denying any pain/tenderness behind knees or in calves. Patient rating pain 3/10 and declines pain medication at this time. Patient educated to notify nursing staff of: SOB, chest pain/heaviness, blurred vision, lightheadedness, increase in bleeding, and passing of clots, patient verbalized understanding. 1105 Bedside and Verbal shift change report given to Melanie Sanchez RN (oncoming nurse) by Juve Reyes RN (offgoing nurse). Report included the following information SBAR, Kardex, Intake/Output, MAR and Recent Results.

## 2021-01-30 NOTE — PROGRESS NOTES
1105 Bedside and Verbal shift change report given to Jam Tanner RN (oncoming nurse) by Rachelle Carter RN (offgoing nurse). Report included the following information SBAR, Kardex, Intake/Output, MAR and Recent Results.

## 2021-01-30 NOTE — PROGRESS NOTES
Problem: Vaginal Delivery: Day of Deliver-Laboring  Goal: *Optimal pain control at patient's stated goal  Outcome: Progressing Towards Goal

## 2021-01-30 NOTE — PROGRESS NOTES
Bedside and Verbal shift change report given to CR Basilio RN (oncoming nurse) by SHAHBAZ Rodriguez RN (offgoing nurse). Report given with Claudia LOREDO and MAR.

## 2021-01-30 NOTE — PROGRESS NOTES
Progress Note    Patient: Chelle Tabor MRN: [id number]  SSN: [id number]    YOB: 1990  Age: 26 y.o. Sex: female      Subjective:     Postpartum Day: 2     Delivery: vaginal delivery    The patient feels well. The patient denies emotional concerns. The baby iswell. Baby is feeding via breast.  The patient is ambulating well. The patient  tolerating a normal diet. Flatus has been passed. Objective:      Patient Vitals for the past 8 hrs:   BP Temp Pulse Resp SpO2   01/30/21 0755 112/69 98.1 °F (36.7 °C) 69 16 100 %     LABS: No results found for this or any previous visit (from the past 24 hour(s)). Lochia:  appropriate   Uterine Fundus:   firm   Fundus Location:  -1   Incision:  no significant drainage   DVT Evaluation:  No evidence of DVT seen on physical exam.     Lab/Data Review: All lab results for the last 24 hours reviewed. Assessment:     Status post: Doing well postpartum vaginal delivery     Plan:     Postpartum care discussed including diet, ambulation, and actvitiy restrictions. Discharge instructions and questions answered for vaginal delivery.     Signed By: Gayle Isaacs MD     January 30, 2021

## 2021-01-30 NOTE — PROGRESS NOTES
Problem: Patient Education: Go to Patient Education Activity  Goal: Patient/Family Education  Outcome: Progressing Towards Goal     Problem: Pain  Goal: *Control of Pain  Outcome: Progressing Towards Goal     Problem: Vaginal Delivery: Postpartum 2  Goal: Activity/Safety  Outcome: Progressing Towards Goal  Goal: Nutrition/Diet  Outcome: Progressing Towards Goal  Goal: Discharge Planning  Outcome: Progressing Towards Goal  Goal: Medications  Outcome: Progressing Towards Goal  Goal: Treatments/Interventions/Procedures  Outcome: Progressing Towards Goal  Goal: Psychosocial  Outcome: Progressing Towards Goal

## 2021-01-30 NOTE — PROGRESS NOTES
Problem: Patient Education: Go to Patient Education Activity  Goal: Patient/Family Education  Outcome: Progressing Towards Goal     Problem: Vaginal Delivery: Postpartum 2  Goal: Activity/Safety  Outcome: Progressing Towards Goal  Goal: Consults, if ordered  Outcome: Progressing Towards Goal  Goal: Nutrition/Diet  Outcome: Progressing Towards Goal  Goal: Discharge Planning  Outcome: Progressing Towards Goal  Goal: Medications  Outcome: Progressing Towards Goal  Goal: Treatments/Interventions/Procedures  Outcome: Progressing Towards Goal  Goal: Psychosocial  Outcome: Progressing Towards Goal     Problem: Vaginal Delivery: Discharge Outcomes  Goal: *Verbalizes name, dosage, time, side effects, and number of days to continue medications  1/30/2021 1212 by Bijan Porch  Outcome: Progressing Towards Goal  1/30/2021 1211 by Bijan Porch  Outcome: Progressing Towards Goal  Goal: *Describes available resources and support systems  1/30/2021 1212 by Bijan Villalbach  Outcome: Progressing Towards Goal  1/30/2021 1211 by Bijan Porch  Outcome: Progressing Towards Goal  Goal: *No signs and symptoms of infection  1/30/2021 1212 by Bijan Porch  Outcome: Progressing Towards Goal  1/30/2021 1211 by Bijan Porch  Outcome: Progressing Towards Goal  Goal: *Birth certificate information completed  1/30/2021 1212 by Bijan Porch  Outcome: Progressing Towards Goal  1/30/2021 1211 by Bijan Porch  Outcome: Progressing Towards Goal  Goal: *Received and verbalizes understanding of discharge plan and instructions  1/30/2021 1212 by Bijan Porch  Outcome: Progressing Towards Goal  1/30/2021 1211 by Bijan Porch  Outcome: Progressing Towards Goal  Goal: *Vital signs within defined limits  1/30/2021 1212 by Bijan Machuca  Outcome: Progressing Towards Goal  1/30/2021 1211 by Bijan Porch  Outcome: Progressing Towards Goal  Goal: *Labs within defined limits  1/30/2021 1212 by Reynaldo Mely Willoughby  Outcome: Progressing Towards Goal  1/30/2021 1211 by Vikash Cabral  Outcome: Progressing Towards Goal  Goal: *Hemodynamically stable  1/30/2021 1212 by Vikash Cabral  Outcome: Progressing Towards Goal  1/30/2021 1211 by Vikash Cabral  Outcome: Progressing Towards Goal  Goal: *Optimal pain control at patient's stated goal  1/30/2021 1212 by Vikash Cabral  Outcome: Progressing Towards Goal  1/30/2021 1211 by Vikash Cabral  Outcome: Progressing Towards Goal  Goal: *Participates in infant care  1/30/2021 1212 by Vikash Cabral  Outcome: Progressing Towards Goal  1/30/2021 1211 by Vikash Cabral  Outcome: Progressing Towards Goal  Goal: *Demonstrates progressive activity  1/30/2021 1212 by Vikash Cabral  Outcome: Progressing Towards Goal  1/30/2021 1211 by Vikash Cabral  Outcome: Progressing Towards Goal  Goal: *Appropriate parent-infant bonding  1/30/2021 1212 by Vikash Cabral  Outcome: Progressing Towards Goal  1/30/2021 1211 by Vikash Cabral  Outcome: Progressing Towards Goal  Goal: *Tolerating diet  1/30/2021 1212 by Vikash Cabral  Outcome: Progressing Towards Goal  1/30/2021 1211 by Vikash Cabral  Outcome: Progressing Towards Goal     Problem: Pain  Goal: *Control of Pain  Outcome: Progressing Towards Goal  Goal: *PALLIATIVE CARE:  Alleviation of Pain  Outcome: Progressing Towards Goal     Problem: Patient Education: Go to Patient Education Activity  Goal: Patient/Family Education  Outcome: Progressing Towards Goal

## 2021-01-30 NOTE — PROGRESS NOTES
1335 I have reviewed discharge instructions with the patient. The patient verbalized understanding. The patient was given the opportunity to ask questions and have all questions answered to their satisfaction. The patient was provided with a written copy of the discharge instructions. The patient expressed no further needs at this time.      9143 The patient was escorted to the front entrance of the hospital, via wheelchair,  and departed the premises by NEMO

## 2021-01-30 NOTE — LACTATION NOTE
This note was copied from a baby's chart. 1805 per mom, has been pumping q3-4. No questions at this time. Will remain available.  LC called to room. Parents expressed concerns regarding bottle feedings. Per parents,  has not taken more than a few mls of formula,  will gag when nipple is placed in the mouth. LC attempted to fed , teaching parents how to slant the nipple up towards the upper palate. LC switched to orthodontic nipple.  feeding improved with no gagging. Notified RN. Breastfeeding discharge teaching completed to include feeding on demand, engorgement, mastitis, clogged ducts, pumping, breastmilk storage, and returning to work. Information given about unit and office phone numbers and encouraged mom to reach out if concerns arise, but that  [facility] would be calling her in the next few days to follow up on breastfeeding. Mom verbalized understanding and no questions at this time.

## 2021-02-01 NOTE — DISCHARGE SUMMARY
Obstetrical Discharge Summary     Name: Pramod Kennedy MRN: [id number]  SSN: [id number]    YOB: 1990  Age: 26 y.o. Sex: female      Admit Date: 2021    Discharge Date: 2021    Admitting Physician: Zev Tao MD     Attending Physician:  No att. providers found     Discharge Diagnoses:s/p vaginal delivery of female infant. Information for the patient's : [facility] [[id number]]   Delivery of a 6 lb 13.5 oz (3.105 kg) female infant via Vaginal, Spontaneous on 2021 at 4:35 PM  by Adrienne Michel. Apgars were 9  and 9 . Additional Diagnoses:   Problem List as of 2021 Date Reviewed: 2021          Codes Class Noted - Resolved    Condyloma ICD-10-CM: A63.0  ICD-9-CM: 078.11  3/1/2015 - Present        History of UTI ICD-10-CM: Z87.440  ICD-9-CM: V13.02  2012 - Present        Spontaneous vaginal delivery ICD-10-CM: O80  ICD-9-CM: 650  2021 - Present        Perineal laceration with delivery, second degree ICD-10-CM: O70.1  ICD-9-CM: 664.11  2021 - Present        * (Principal) RESOLVED: IUP (intrauterine pregnancy), incidental ICD-10-CM: Z33.1  ICD-9-CM: V22.2  2021 - 2021        RESOLVED: Excessive fetal growth affecting management of pregnancy in second trimester ICD-10-CM: O36.62X0  ICD-9-CM: 656.63  2020 - 2021    Overview Addendum 2021  7:31 AM by Marcelo Gates MD     LGA, EFW 90%, female. Early glucola. 121  EFW 38 weeks. 20 EFW 5lb 7oz 57%, vertex. 1/15/21 EFW 6lb 7oz 21%. RESOLVED: Supervision of normal first pregnancy ICD-10-CM: Z34.00  ICD-9-CM: V22.0  2020 - 2021            No results found for: RUBELLAEXT, GRBSEXT  No results for input(s): HGB, HGBEXT in the last 72 hours. Hospital Course: Normal hospital course following the delivery.     Disposition: home    Discharge Condition: Good    Patient Instructions:   Cannot display discharge medications since this patient is not currently admitted. Reference my discharge instructions. Follow-up Appointments   Procedures    FOLLOW UP VISIT Appointment in: 6 Weeks     Standing Status:   Standing     Number of Occurrences:   1     Order Specific Question:   Appointment in     Answer:   6 Weeks        Signed By:  Chris Amaral MD     February 1, 2021                       [facility] .

## 2023-05-18 ENCOUNTER — HOSPITAL ENCOUNTER (INPATIENT)
Facility: HOSPITAL | Age: 33
LOS: 1 days | Discharge: HOME | End: 2023-05-19
Attending: OBSTETRICS & GYNECOLOGY | Admitting: STUDENT IN AN ORGANIZED HEALTH CARE EDUCATION/TRAINING PROGRAM
Payer: COMMERCIAL

## 2023-05-18 ENCOUNTER — ANESTHESIA EVENT (OUTPATIENT)
Facility: HOSPITAL | Age: 33
End: 2023-05-18
Payer: COMMERCIAL

## 2023-05-18 ENCOUNTER — ANESTHESIA (OUTPATIENT)
Facility: HOSPITAL | Age: 33
End: 2023-05-18
Payer: COMMERCIAL

## 2023-05-18 PROBLEM — O24.410 DIET CONTROLLED GESTATIONAL DIABETES MELLITUS (GDM): Status: ACTIVE | Noted: 2023-05-18

## 2023-05-18 PROBLEM — Z3A.37 37 WEEKS GESTATION OF PREGNANCY: Status: ACTIVE | Noted: 2023-05-18

## 2023-05-18 LAB
ABO + RH BLD: NORMAL
APPEARANCE UR: CLEAR
BILIRUB UR QL: NEGATIVE
BLD PROD TYP BPU: NORMAL
BLD PROD TYP BPU: NORMAL
BLOOD BANK DISPENSE STATUS: NORMAL
BLOOD BANK DISPENSE STATUS: NORMAL
BLOOD GROUP ANTIBODIES SERPL: NORMAL
BLOOD GROUP ANTIBODIES SERPL: NORMAL
BPU ID: NORMAL
BPU ID: NORMAL
CALLED TO: NORMAL
COLOR UR: YELLOW
CROSSMATCH RESULT: NORMAL
CROSSMATCH RESULT: NORMAL
ERYTHROCYTE [DISTWIDTH] IN BLOOD BY AUTOMATED COUNT: 12.8 % (ref 11.6–14.5)
GLUCOSE BLD STRIP.AUTO-MCNC: 75 MG/DL (ref 70–110)
GLUCOSE UR QL STRIP.AUTO: NEGATIVE MG/DL
HCT VFR BLD AUTO: 37.7 % (ref 35–45)
HGB BLD-MCNC: 12.6 G/DL (ref 12–16)
KETONES UR-MCNC: NEGATIVE MG/DL
LEUKOCYTE ESTERASE UR QL STRIP: NEGATIVE
MCH RBC QN AUTO: 29.4 PG (ref 24–34)
MCHC RBC AUTO-ENTMCNC: 33.4 G/DL (ref 31–37)
MCV RBC AUTO: 88.1 FL (ref 78–100)
NITRITE UR QL: NEGATIVE
NRBC # BLD: 0 K/UL (ref 0–0.01)
NRBC BLD-RTO: 0 PER 100 WBC
PH UR: 6 (ref 5–9)
PLATELET # BLD AUTO: 165 K/UL (ref 135–420)
PMV BLD AUTO: 10.4 FL (ref 9.2–11.8)
PROT UR QL: NEGATIVE MG/DL
RBC # BLD AUTO: 4.28 M/UL (ref 4.2–5.3)
RBC # UR STRIP: NEGATIVE
SERVICE CMNT-IMP: NORMAL
SP GR UR: 1.01 (ref 1–1.02)
SPECIMEN EXP DATE BLD: NORMAL
UNIT DIVISION: 0
UNIT DIVISION: 0
UROBILINOGEN UR QL: 0.2 EU/DL (ref 0.2–1)
WBC # BLD AUTO: 5.2 K/UL (ref 4.6–13.2)

## 2023-05-18 PROCEDURE — 0KQM0ZZ REPAIR PERINEUM MUSCLE, OPEN APPROACH: ICD-10-PCS | Performed by: STUDENT IN AN ORGANIZED HEALTH CARE EDUCATION/TRAINING PROGRAM

## 2023-05-18 PROCEDURE — 86922 COMPATIBILITY TEST ANTIGLOB: CPT

## 2023-05-18 PROCEDURE — 86921 COMPATIBILITY TEST INCUBATE: CPT

## 2023-05-18 PROCEDURE — 1100000000 HC RM PRIVATE

## 2023-05-18 PROCEDURE — 3700000156 HC EPIDURAL ANESTHESIA

## 2023-05-18 PROCEDURE — 2580000003 HC RX 258: Performed by: STUDENT IN AN ORGANIZED HEALTH CARE EDUCATION/TRAINING PROGRAM

## 2023-05-18 PROCEDURE — 86920 COMPATIBILITY TEST SPIN: CPT

## 2023-05-18 PROCEDURE — 7220000101 HC DELIVERY VAGINAL/SINGLE

## 2023-05-18 PROCEDURE — 86901 BLOOD TYPING SEROLOGIC RH(D): CPT

## 2023-05-18 PROCEDURE — 3700000025 EPIDURAL BLOCK: Performed by: ANESTHESIOLOGY

## 2023-05-18 PROCEDURE — 86900 BLOOD TYPING SEROLOGIC ABO: CPT

## 2023-05-18 PROCEDURE — 2500000003 HC RX 250 WO HCPCS: Performed by: NURSE ANESTHETIST, CERTIFIED REGISTERED

## 2023-05-18 PROCEDURE — 7210000100 HC LABOR FEE PER 1 HR

## 2023-05-18 PROCEDURE — 86850 RBC ANTIBODY SCREEN: CPT

## 2023-05-18 PROCEDURE — 6360000002 HC RX W HCPCS: Performed by: STUDENT IN AN ORGANIZED HEALTH CARE EDUCATION/TRAINING PROGRAM

## 2023-05-18 PROCEDURE — 85027 COMPLETE CBC AUTOMATED: CPT

## 2023-05-18 PROCEDURE — 81003 URINALYSIS AUTO W/O SCOPE: CPT

## 2023-05-18 PROCEDURE — 59025 FETAL NON-STRESS TEST: CPT

## 2023-05-18 PROCEDURE — 4A1HXCZ MONITORING OF PRODUCTS OF CONCEPTION, CARDIAC RATE, EXTERNAL APPROACH: ICD-10-PCS | Performed by: STUDENT IN AN ORGANIZED HEALTH CARE EDUCATION/TRAINING PROGRAM

## 2023-05-18 PROCEDURE — 86870 RBC ANTIBODY IDENTIFICATION: CPT

## 2023-05-18 PROCEDURE — 00HU33Z INSERTION OF INFUSION DEVICE INTO SPINAL CANAL, PERCUTANEOUS APPROACH: ICD-10-PCS | Performed by: STUDENT IN AN ORGANIZED HEALTH CARE EDUCATION/TRAINING PROGRAM

## 2023-05-18 PROCEDURE — 82962 GLUCOSE BLOOD TEST: CPT

## 2023-05-18 PROCEDURE — 6360000002 HC RX W HCPCS

## 2023-05-18 PROCEDURE — 2580000003 HC RX 258

## 2023-05-18 RX ORDER — PHENYLEPHRINE HCL IN 0.9% NACL 1 MG/10 ML
100 SYRINGE (ML) INTRAVENOUS PRN
Status: DISCONTINUED | OUTPATIENT
Start: 2023-05-18 | End: 2023-05-18 | Stop reason: HOSPADM

## 2023-05-18 RX ORDER — TRANEXAMIC ACID 10 MG/ML
1000 INJECTION, SOLUTION INTRAVENOUS
Status: DISCONTINUED | OUTPATIENT
Start: 2023-05-18 | End: 2023-05-18 | Stop reason: HOSPADM

## 2023-05-18 RX ORDER — MISOPROSTOL 200 UG/1
800 TABLET ORAL PRN
Status: DISCONTINUED | OUTPATIENT
Start: 2023-05-18 | End: 2023-05-18 | Stop reason: HOSPADM

## 2023-05-18 RX ORDER — SODIUM CHLORIDE 0.9 % (FLUSH) 0.9 %
5-40 SYRINGE (ML) INJECTION PRN
Status: DISCONTINUED | OUTPATIENT
Start: 2023-05-18 | End: 2023-05-18 | Stop reason: HOSPADM

## 2023-05-18 RX ORDER — FENUGREEK SEED/BL.THISTLE/ANIS 340 MG
CAPSULE ORAL
COMMUNITY

## 2023-05-18 RX ORDER — IBUPROFEN 400 MG/1
800 TABLET ORAL EVERY 8 HOURS PRN
Status: DISCONTINUED | OUTPATIENT
Start: 2023-05-18 | End: 2023-05-20 | Stop reason: HOSPADM

## 2023-05-18 RX ORDER — SODIUM CHLORIDE, SODIUM LACTATE, POTASSIUM CHLORIDE, CALCIUM CHLORIDE 600; 310; 30; 20 MG/100ML; MG/100ML; MG/100ML; MG/100ML
INJECTION, SOLUTION INTRAVENOUS CONTINUOUS
Status: DISCONTINUED | OUTPATIENT
Start: 2023-05-18 | End: 2023-05-18 | Stop reason: HOSPADM

## 2023-05-18 RX ORDER — SODIUM CHLORIDE, SODIUM LACTATE, POTASSIUM CHLORIDE, AND CALCIUM CHLORIDE .6; .31; .03; .02 G/100ML; G/100ML; G/100ML; G/100ML
1000 INJECTION, SOLUTION INTRAVENOUS PRN
Status: DISCONTINUED | OUTPATIENT
Start: 2023-05-18 | End: 2023-05-18 | Stop reason: HOSPADM

## 2023-05-18 RX ORDER — SODIUM CHLORIDE, SODIUM LACTATE, POTASSIUM CHLORIDE, AND CALCIUM CHLORIDE .6; .31; .03; .02 G/100ML; G/100ML; G/100ML; G/100ML
500 INJECTION, SOLUTION INTRAVENOUS PRN
Status: DISCONTINUED | OUTPATIENT
Start: 2023-05-18 | End: 2023-05-18 | Stop reason: HOSPADM

## 2023-05-18 RX ORDER — METHYLERGONOVINE MALEATE 0.2 MG/ML
200 INJECTION INTRAVENOUS PRN
Status: DISCONTINUED | OUTPATIENT
Start: 2023-05-18 | End: 2023-05-18 | Stop reason: HOSPADM

## 2023-05-18 RX ORDER — SODIUM CHLORIDE 0.9 % (FLUSH) 0.9 %
5-40 SYRINGE (ML) INJECTION EVERY 12 HOURS SCHEDULED
Status: DISCONTINUED | OUTPATIENT
Start: 2023-05-18 | End: 2023-05-18 | Stop reason: HOSPADM

## 2023-05-18 RX ORDER — SODIUM CHLORIDE 9 MG/ML
25 INJECTION, SOLUTION INTRAVENOUS PRN
Status: DISCONTINUED | OUTPATIENT
Start: 2023-05-18 | End: 2023-05-18 | Stop reason: HOSPADM

## 2023-05-18 RX ORDER — ONDANSETRON 2 MG/ML
4 INJECTION INTRAMUSCULAR; INTRAVENOUS EVERY 6 HOURS PRN
Status: DISCONTINUED | OUTPATIENT
Start: 2023-05-18 | End: 2023-05-18 | Stop reason: HOSPADM

## 2023-05-18 RX ORDER — CARBOPROST TROMETHAMINE 250 UG/ML
250 INJECTION, SOLUTION INTRAMUSCULAR PRN
Status: DISCONTINUED | OUTPATIENT
Start: 2023-05-18 | End: 2023-05-18 | Stop reason: HOSPADM

## 2023-05-18 RX ORDER — TERBUTALINE SULFATE 1 MG/ML
0.25 INJECTION, SOLUTION SUBCUTANEOUS
Status: DISCONTINUED | OUTPATIENT
Start: 2023-05-18 | End: 2023-05-18 | Stop reason: HOSPADM

## 2023-05-18 RX ORDER — ACETAMINOPHEN 325 MG/1
650 TABLET ORAL EVERY 4 HOURS PRN
Status: DISCONTINUED | OUTPATIENT
Start: 2023-05-18 | End: 2023-05-20 | Stop reason: HOSPADM

## 2023-05-18 RX ORDER — DIPHENHYDRAMINE HYDROCHLORIDE 50 MG/ML
50 INJECTION INTRAMUSCULAR; INTRAVENOUS EVERY 4 HOURS PRN
Status: DISCONTINUED | OUTPATIENT
Start: 2023-05-18 | End: 2023-05-18 | Stop reason: HOSPADM

## 2023-05-18 RX ORDER — EPHEDRINE SULFATE/0.9% NACL/PF 50 MG/5 ML
10 SYRINGE (ML) INTRAVENOUS PRN
Status: DISCONTINUED | OUTPATIENT
Start: 2023-05-18 | End: 2023-05-18 | Stop reason: HOSPADM

## 2023-05-18 RX ORDER — BISACODYL 10 MG
10 SUPPOSITORY, RECTAL RECTAL DAILY PRN
Status: DISCONTINUED | OUTPATIENT
Start: 2023-05-18 | End: 2023-05-20 | Stop reason: HOSPADM

## 2023-05-18 RX ORDER — NALOXONE HYDROCHLORIDE 0.4 MG/ML
INJECTION, SOLUTION INTRAMUSCULAR; INTRAVENOUS; SUBCUTANEOUS PRN
Status: DISCONTINUED | OUTPATIENT
Start: 2023-05-18 | End: 2023-05-18 | Stop reason: HOSPADM

## 2023-05-18 RX ORDER — DOCUSATE SODIUM 100 MG/1
100 CAPSULE, LIQUID FILLED ORAL 2 TIMES DAILY
Status: DISCONTINUED | OUTPATIENT
Start: 2023-05-18 | End: 2023-05-20 | Stop reason: HOSPADM

## 2023-05-18 RX ORDER — SIMETHICONE 80 MG
80 TABLET,CHEWABLE ORAL EVERY 6 HOURS PRN
Status: DISCONTINUED | OUTPATIENT
Start: 2023-05-18 | End: 2023-05-20 | Stop reason: HOSPADM

## 2023-05-18 RX ORDER — LIDOCAINE HYDROCHLORIDE AND EPINEPHRINE 20; 5 MG/ML; UG/ML
INJECTION, SOLUTION EPIDURAL; INFILTRATION; INTRACAUDAL; PERINEURAL PRN
Status: DISCONTINUED | OUTPATIENT
Start: 2023-05-18 | End: 2023-05-18 | Stop reason: SDUPTHER

## 2023-05-18 RX ADMIN — Medication 166.7 ML: at 19:10

## 2023-05-18 RX ADMIN — SODIUM CHLORIDE, PRESERVATIVE FREE 10 ML: 5 INJECTION INTRAVENOUS at 15:19

## 2023-05-18 RX ADMIN — LIDOCAINE HYDROCHLORIDE,EPINEPHRINE BITARTRATE 2 ML: 20; .005 INJECTION, SOLUTION EPIDURAL; INFILTRATION; INTRACAUDAL; PERINEURAL at 18:07

## 2023-05-18 RX ADMIN — ROPIVACAINE HYDROCHLORIDE 12 ML/HR: 5 INJECTION, SOLUTION EPIDURAL; INFILTRATION; PERINEURAL at 18:07

## 2023-05-18 RX ADMIN — SODIUM CHLORIDE, POTASSIUM CHLORIDE, SODIUM LACTATE AND CALCIUM CHLORIDE 1000 ML: 600; 310; 30; 20 INJECTION, SOLUTION INTRAVENOUS at 17:13

## 2023-05-18 RX ADMIN — SODIUM CHLORIDE, PRESERVATIVE FREE 10 ML: 5 INJECTION INTRAVENOUS at 10:00

## 2023-05-18 RX ADMIN — SODIUM CHLORIDE, POTASSIUM CHLORIDE, SODIUM LACTATE AND CALCIUM CHLORIDE: 600; 310; 30; 20 INJECTION, SOLUTION INTRAVENOUS at 15:20

## 2023-05-18 RX ADMIN — LIDOCAINE HYDROCHLORIDE,EPINEPHRINE BITARTRATE 3 ML: 20; .005 INJECTION, SOLUTION EPIDURAL; INFILTRATION; INTRACAUDAL; PERINEURAL at 18:03

## 2023-05-18 RX ADMIN — Medication 2 MILLI-UNITS/MIN: at 15:30

## 2023-05-18 ASSESSMENT — PAIN SCALES - GENERAL: PAINLEVEL_OUTOF10: 2

## 2023-05-18 NOTE — L&D DELIVERY NOTE
Vaginal delivery note    Called to patient room as she was noted to be c/c/+1 by RN. With several rounds of pushes, baby delivered in vertex presentation and YASH position. Loose nuchal cord noted. Shoulders delivered easily. Nuchal cord easily reduced after delivery. Baby placed on maternal abdomen. Cord clamped and cut after 1 minute. Placenta delivered complete without complication. Small 2nd degree perineal laceration noted. Repaired with running 2-0 vicryl. Hemostasis noted. Mom and baby bonding in stable condition. Chayito Dumont [[id number]]      Labor Events     Labor: No   Steroids: None  Cervical Ripening Date/Time:        Rupture Date/Time:  23 03:00:00   Rupture Type: SROM  Fluid Color: Clear  Fluid Odor: None  Fluid Volume:  Moderate  Augmentation: Oxytocin  Labor Complications: None              Anesthesia    Method: Epidural       Labor Event Times      Labor onset date/time:        Dilation complete date/time:  23 18:45:00     Start pushing date/time:  2023 19:01:00   Decision date/time (emergent ):            Delivery Details      Delivery Date: 23 Delivery Time: 19:09:00   Delivery Type: Vaginal, Spontaneous               Presentation    Presentation: Vertex  Position: Left  _: Occiput  _: Anterior       Shoulder Dystocia    Shoulder Dystocia Present?: No       Assisted Delivery Details    Forceps Attempted?: No  Vacuum Extractor Attempted?: No                           Cord    Vessels: 3 Vessels  Complications: Nuchal Loose  Cord Around: Head  Delayed Cord Clamping?: Yes  Cord Clamped Date/Time: 2023 19:11:00  Cord Blood Disposition: Lab  Gases Sent?: No              Placenta    Date/Time: 2023 19:13:00  Removal: Spontaneous  Appearance: Intact  Disposition: Discarded       Lacerations    Episiotomy: None  Perineal Lacerations: 2nd  Other Lacerations: no non-perineal laceration  Number of Repair Packets: 1       Vaginal Counts

## 2023-05-18 NOTE — PROGRESS NOTES
0751 Pt arrived on unit, via ambulation, with c/o LOF, denies vag bleeding, pos for fetal movement. 0805 Pt placed on EFM, VS obtained, and SVE and nitrazine test performed  0840 Spoke to Dr. Yobany Merritt, via telephone, admission orders and orders for intermittent monitoring given.

## 2023-05-18 NOTE — PROGRESS NOTES
17: 53 Anesthesia at bedside    1757: In position for epidural    1759: Epidural time out    1802: Epidural catheter placed    1803: Handed Fentanyl 100mcg/ml to MD ARMOND Interiano CRNA to admin in epidural    1803: Epidural test dose    1807: Epidural loading dose admin    1808:  To R tilt

## 2023-05-18 NOTE — ANESTHESIA PROCEDURE NOTES
Epidural Block    Patient location during procedure: OB  Start time: 5/18/2023 6:00 PM  End time: 5/18/2023 6:02 PM  Reason for block: labor epidural  Staffing  Performed: resident/CRNA   Anesthesiologist: Lorenzo Lawrence MD  Resident/CRNA: FRITZ Almanza CRNA  Epidural  Patient position: sitting  Prep: Betadine  Patient monitoring: continuous pulse ox and frequent blood pressure checks  Approach: midline  Location: L3-4  Injection technique: NOEMÍ saline  Provider prep: mask and sterile gloves  Needle  Needle type: Tuohy   Needle gauge: 18 G  Needle length: 2.5 in  Needle insertion depth: 4 cm  Catheter type: end hole  Catheter size: 23 G  Catheter at skin depth: 10 cmCatheter Secured: tegaderm and tape  Assessment  Hemodynamics: stable  Attempts: 1  Outcomes: uncomplicated  Preanesthetic Checklist  Completed: patient identified, IV checked, site marked, risks and benefits discussed, surgical/procedural consents, equipment checked, pre-op evaluation, timeout performed, anesthesia consent given, oxygen available, monitors applied/VS acknowledged, fire risk safety assessment completed and verbalized and blood product R/B/A discussed and consented

## 2023-05-18 NOTE — H&P
History & Physical    Name: Victoriano Evnas MRN: [id number]  SSN: [id number]    YOB: 1990  Age: 27 y.o. Sex: female        Subjective:     Estimated Date of Delivery: 23  OB History    Para Term  AB Living   2 1 1 0 0 1   SAB IAB Ectopic Molar Multiple Live Births             1      # Outcome Date GA Lbr Eris/2nd Weight Sex Delivery Anes PTL Lv   2 Current            1 Term 21 40w0d 20:35 / 02:00 6 lb 13.5 oz (3.105 kg) F Vag-Spont EPI N CL      Birth Comments: Aubrey Chang       Ms. Melvina Hwang is admitted with pregnancy at 37w4d for Presumptive ROM . Prenatal course was complicated by K3DJ. Please see prenatal records for details. Past Medical History:   Diagnosis Date    Abnormal Papanicolaou smear of cervix     HPV    Condyloma 2015    perianal     Past Surgical History:   Procedure Laterality Date    WISDOM TOOTH EXTRACTION       Social History     Occupational History    Not on file   Tobacco Use    Smoking status: Never    Smokeless tobacco: Never   Vaping Use    Vaping Use: Never used   Substance and Sexual Activity    Alcohol use: Not Currently    Drug use: No    Sexual activity: Yes     Partners: Male     Birth control/protection: Condom, Pill     History reviewed. No pertinent family history. Allergies   Allergen Reactions    Haemophilus Influenzae Vaccines      Prior to Admission medications    Medication Sig Start Date End Date Taking? Authorizing Provider   Gcifkp-Euwhusjla-Iqiy-Fish Oil (PRENATAL + COMPLETE MULTI) 0.267 & 373 MG THPK Take by mouth   Yes Historical Provider, MD        Review of Systems: Pertinent items are noted in HPI.     Objective:     Vitals:  Vitals:    23 0830 23 0900 23 0930 23 1000   BP: 117/71 109/74 114/69 111/70   Pulse: 75 81 86 74   Resp:    16   Temp:    98.2 °F (36.8 °C)   TempSrc:    Oral   SpO2:       Weight:       Height:            Physical Exam:  Gen: NAD  Abd: Soft, NT,

## 2023-05-19 VITALS
TEMPERATURE: 97.5 F | WEIGHT: 135 LBS | SYSTOLIC BLOOD PRESSURE: 117 MMHG | HEART RATE: 72 BPM | HEIGHT: 66 IN | BODY MASS INDEX: 21.69 KG/M2 | OXYGEN SATURATION: 100 % | RESPIRATION RATE: 18 BRPM | DIASTOLIC BLOOD PRESSURE: 67 MMHG

## 2023-05-19 LAB
ERYTHROCYTE [DISTWIDTH] IN BLOOD BY AUTOMATED COUNT: 13.1 % (ref 11.6–14.5)
GLUCOSE BLD STRIP.AUTO-MCNC: 81 MG/DL (ref 70–110)
HCT VFR BLD AUTO: 33.5 % (ref 35–45)
HGB BLD-MCNC: 11.2 G/DL (ref 12–16)
MCH RBC QN AUTO: 29.6 PG (ref 24–34)
MCHC RBC AUTO-ENTMCNC: 33.4 G/DL (ref 31–37)
MCV RBC AUTO: 88.6 FL (ref 78–100)
NRBC # BLD: 0 K/UL (ref 0–0.01)
NRBC BLD-RTO: 0 PER 100 WBC
PLATELET # BLD AUTO: 158 K/UL (ref 135–420)
PMV BLD AUTO: 10.6 FL (ref 9.2–11.8)
RBC # BLD AUTO: 3.78 M/UL (ref 4.2–5.3)
WBC # BLD AUTO: 8 K/UL (ref 4.6–13.2)

## 2023-05-19 PROCEDURE — 85027 COMPLETE CBC AUTOMATED: CPT

## 2023-05-19 PROCEDURE — 36415 COLL VENOUS BLD VENIPUNCTURE: CPT

## 2023-05-19 PROCEDURE — 6370000000 HC RX 637 (ALT 250 FOR IP): Performed by: STUDENT IN AN ORGANIZED HEALTH CARE EDUCATION/TRAINING PROGRAM

## 2023-05-19 PROCEDURE — 1100000000 HC RM PRIVATE

## 2023-05-19 PROCEDURE — 82962 GLUCOSE BLOOD TEST: CPT

## 2023-05-19 RX ADMIN — IBUPROFEN 800 MG: 400 TABLET, FILM COATED ORAL at 09:21

## 2023-05-19 RX ADMIN — DOCUSATE SODIUM 100 MG: 100 CAPSULE, LIQUID FILLED ORAL at 23:26

## 2023-05-19 RX ADMIN — DOCUSATE SODIUM 100 MG: 100 CAPSULE, LIQUID FILLED ORAL at 09:21

## 2023-05-19 ASSESSMENT — PAIN SCALES - GENERAL
PAINLEVEL_OUTOF10: 3
PAINLEVEL_OUTOF10: 3
PAINLEVEL_OUTOF10: 0

## 2023-05-19 NOTE — ANESTHESIA POSTPROCEDURE EVALUATION
5/19/2023  5:08 PM    Laboring Epidural Follow-up Note       Referring physician: Ignacia Mendoza MD   Patient status post vaginal delivery with labor epidural.      /67   Pulse 72   Temp 36.4 °C (97.5 °F) (Oral)   Resp 18   Ht 5' 6\" (1.676 m)   Wt 135 lb (61.2 kg)   SpO2 100%   Breastfeeding Unknown   BMI 21.79 kg/m²         Thanh Schaefer, APRN - CRNA

## 2023-05-19 NOTE — DISCHARGE SUMMARY
Obstetrical Discharge Summary     Name: Nathan Ceja MRN: [id number]  SSN: [id number]    YOB: 1990  Age: 27 y.o. Sex: female      Admit Date: 2023    Discharge Date: 2023    Admitting Physician: Lalito Francois MD     Attending Physician:  Lalito Francois MD     Discharge Diagnoses: Nitza Purchase Boy Smiley Dolphin [[id number]]       Information    Head delivery date/time: 2023 19:09:00   Changing the 's delivery date/time could affect patient care.:      Delivery date/time:  23   Delivery type: Vaginal, Spontaneous    Details: Additional Diagnoses:   Patient Active Problem List   Diagnosis    Condyloma    History of UTI    40 weeks gestation of pregnancy    Diet controlled gestational diabetes mellitus (GDM)    Spontaneous vaginal delivery    Perineal laceration with delivery, second degree     No components found for: Karyna Woods, ARIE DOUGHERTYEXMAN  Recent Labs     23  0551   [facility] course following the delivery. Disposition: home    Discharge Condition: Good    Patient Instructions:   Current Discharge Medication List        CONTINUE these medications which have NOT CHANGED    Details   Hpxkvh-Hhrcqpnyo-Ipyc-Fish Oil (PRENATAL + COMPLETE MULTI) 0.267 & 373 MG THPK Take by mouth           STOP taking these medications       Norethin Ace-Eth Estrad-FE (BLISOVI 24 FE) 1-20 MG-MCG(24) TABS Comments:   Reason for Stopping:                Reference my discharge instructions. Follow up in 4-6 weeks for your routine postpartum visit. [address], [address] [place] [address]    Call [phone] to  [facility] your appointment.          Signed By:  Lalito Francois MD     May 19, 2023

## 2023-05-19 NOTE — PLAN OF CARE
Problem: Pain  Goal: Verbalizes/displays adequate comfort level or baseline comfort level  2023 1302 by Soila Cesar LPN  Outcome: Progressing  2023 by Dario Correa RN  Outcome: Progressing     Problem: Vaginal Birth or  Section  Goal: Fetal and maternal status remain reassuring during the birth process  Description:  Birth OB-Pregnancy care plan goal which identifies if the fetal and maternal status remain reassuring during the birth process  2023 1302 by Soila Cesar LPN  Outcome: Progressing  2023 by Dario Correa RN  Outcome: Progressing     Problem: Postpartum  Goal: Experiences normal postpartum course  Description:  Postpartum OB-Pregnancy care plan goal which identifies if the mother is experiencing a normal postpartum course  2023 1302 by Soila Cesar LPN  Outcome: Progressing  2023 by Dario Correa RN  Outcome: Progressing  Goal: Appropriate maternal -  bonding  Description:  Postpartum OB-Pregnancy care plan goal which identifies if the mother and  are bonding appropriately  2023 1302 by Soila Cesar LPN  Outcome: Progressing  2023 by Dario Correa RN  Outcome: Progressing  Goal: Establishment of infant feeding pattern  Description:  Postpartum OB-Pregnancy care plan goal which identifies if the mother is establishing a feeding pattern with their   2023 1302 by Soila Cesar LPN  Outcome: Progressing  2023 by Dario Correa RN  Outcome: Progressing  Goal: Incisions, wounds, or drain sites healing without S/S of infection  2023 1302 by Soila Cesar LPN  Outcome: Progressing  2023 by Dario Correa RN  Outcome: Progressing     Problem: Infection - Adult  Goal: Absence of infection during hospitalization  2023 1302 by Soila Cesar LPN  Outcome: Progressing  2023 by Dario Correa RN  Outcome: Progressing  Goal: Absence of fever/infection

## 2023-05-19 NOTE — PROGRESS NOTES
Post-Partum Day Number 1 Progress Note    Vanessa Moreira     Assessment:   Patient Active Problem List   Diagnosis    Condyloma    History of UTI    40 weeks gestation of pregnancy    Diet controlled gestational diabetes mellitus (GDM)    Spontaneous vaginal delivery    Perineal laceration with delivery, second degree     Doing well, post partum day 1    Plan:  1. Continue routine postpartum and perineal care as well as maternal education. 2. The risks and benefits of the circumcision  procedure and anesthesia including: bleeding, infection, variability of cosmetic results were discussed at length with the mother. She is aware that future repeat procedures may be necessary. She gives informed consent to proceed as noted and her questions are answered. 3. Discharge home this evening if baby able to go home. Follow up in 6 weeks. 4. A1DM - normal fasting BG  5. Desires COCPs for birth control - formula feeding. Will rx in office    Information for the patient's : [facility] [[id number]]   Vaginal, Spontaneous  Patient doing well without significant complaint. Voiding without difficulty, normal lochia.     Current Facility-Administered Medications   Medication Dose Route Frequency Provider Last Rate Last Admin    oxytocin (PITOCIN) 30 units in 500 mL infusion  87.3 oliver-units/min IntraVENous Continuous PRN Macho Rahman MD 87.3 mL/hr at 23 192 87.3 oliver-units/min at 23    acetaminophen (TYLENOL) tablet 650 mg  650 mg Oral Q4H PRN Macho Rahman MD        ibuprofen (ADVIL;MOTRIN) tablet 800 mg  800 mg Oral Q8H PRN Macho Rahman MD        witch hazel-glycerin (TUCKS) pad   Topical PRN Macho Rahman MD        hydrocortisone 2.5 % cream   Topical Q2H PRN Macho Rahman MD        benzocaine-menthol (DERMOPLAST) 20-0.5 % spray   Topical PRN Macho Rahman MD        [facility]) chewable tablet 80 mg  80 mg Oral Q6H PRN Macho Rahman MD        docusate sodium (COLACE) capsule 100 mg  100

## 2023-05-20 NOTE — PROGRESS NOTES
Patient discharged in stable condition with the infant. Discharge instructions reviewed with parents and both verbalized an understanding. Patient in wheelchair and taken through emergency exit for discharge.

## 2023-05-20 NOTE — DISCHARGE INSTRUCTIONS
urination or frequency. You have swelling, bleeding, drainage, foul odor, redness, or warmth in/around your incision or stitches. You have a red, warm, tender area in you calf. Call if you have concerns about your well-being or if you feel you may be showing signs of post partum depression, or have thoughts of harming yourself or infant. Increased pain at the site of the episiotomy. Difficulty urinating. Difficulty breathing with or without chest pain. Headache not relieved by pain meds. If you are saturating more than one maxi pad in an hour, foul smelling vaginal discharge, sudden continuing increased vaginal bleeding with or without clots. If you develop a warm, red, tender area on your breast, have nipple discharge which is foul smelling or contains pus, or develop a fever. NEVER SHAKE A BABY PROMISE. Shaking can kill a baby. It can also cause seizures, brain damage, learning problems,  Cerebral palsy, blindness and other serious health and developmental problems. I PROMISE NEVER TO SHAKE MY BABY    I understand that caregivers other than the mother often shakes babies. I also promise to discuss the dangers of shaking a baby with everyone who takes care of my baby. I promise to tell anyone who care for my baby to never, never shake my baby.

## 2023-05-22 LAB
ABO + RH BLD: NORMAL
BLD PROD TYP BPU: NORMAL
BLD PROD TYP BPU: NORMAL
BLOOD BANK DISPENSE STATUS: NORMAL
BLOOD BANK DISPENSE STATUS: NORMAL
BLOOD GROUP ANTIBODIES SERPL: NORMAL
BLOOD GROUP ANTIBODIES SERPL: NORMAL
BPU ID: NORMAL
BPU ID: NORMAL
CALLED TO: NORMAL
CROSSMATCH RESULT: NORMAL
CROSSMATCH RESULT: NORMAL
SPECIMEN EXP DATE BLD: NORMAL
UNIT DIVISION: 0
UNIT DIVISION: 0